# Patient Record
Sex: FEMALE | Race: WHITE | NOT HISPANIC OR LATINO | URBAN - METROPOLITAN AREA
[De-identification: names, ages, dates, MRNs, and addresses within clinical notes are randomized per-mention and may not be internally consistent; named-entity substitution may affect disease eponyms.]

---

## 2024-08-23 ENCOUNTER — HOSPITAL ENCOUNTER (OUTPATIENT)
Dept: RADIOLOGY | Facility: CLINIC | Age: 69
Discharge: HOME | End: 2024-08-23
Payer: MEDICARE

## 2024-08-23 ENCOUNTER — OFFICE VISIT (OUTPATIENT)
Dept: ORTHOPEDIC SURGERY | Facility: CLINIC | Age: 69
End: 2024-08-23
Payer: MEDICARE

## 2024-08-23 VITALS — HEIGHT: 64 IN | WEIGHT: 148 LBS | BODY MASS INDEX: 25.27 KG/M2

## 2024-08-23 DIAGNOSIS — M79.672 LEFT FOOT PAIN: ICD-10-CM

## 2024-08-23 PROCEDURE — 3008F BODY MASS INDEX DOCD: CPT | Performed by: INTERNAL MEDICINE

## 2024-08-23 PROCEDURE — 99213 OFFICE O/P EST LOW 20 MIN: CPT | Performed by: INTERNAL MEDICINE

## 2024-08-23 PROCEDURE — 73630 X-RAY EXAM OF FOOT: CPT | Mod: LT

## 2024-08-23 PROCEDURE — 1036F TOBACCO NON-USER: CPT | Performed by: INTERNAL MEDICINE

## 2024-08-23 ASSESSMENT — PATIENT HEALTH QUESTIONNAIRE - PHQ9
1. LITTLE INTEREST OR PLEASURE IN DOING THINGS: NOT AT ALL
2. FEELING DOWN, DEPRESSED OR HOPELESS: NOT AT ALL
SUM OF ALL RESPONSES TO PHQ9 QUESTIONS 1 AND 2: 0

## 2024-08-23 NOTE — PROGRESS NOTES
Acute Injury New Patient Visit    CC:   Chief Complaint   Patient presents with    Left Foot - Pain     Patient advised that she twisted her foot yesterday while walking in the kitchen she slipped on some water       HPI: Brunilda is a 69 y.o. female presents today for evaluation for acute left foot injury sustained yesterday after she twisted her left foot while walking in her kitchen and fell. She states that she is doing better currently. She is here for initial evaluation and x-rays.  Thank        Review of Systems   GENERAL: Negative for malaise, significant weight loss, fever  MUSCULOSKELETAL: See HPI  NEURO:  Negative for numbness / tingling     Past Medical History  No past medical history on file.    Medication review  Medication Documentation Review Audit    **Prior to Admission medications have not yet been reviewed**         Allergies  Allergies   Allergen Reactions    Codeine Nausea/vomiting    Oxycodone-Acetaminophen Nausea/vomiting     extreme nausea       Social History  Social History     Socioeconomic History    Marital status:      Spouse name: Not on file    Number of children: Not on file    Years of education: Not on file    Highest education level: Not on file   Occupational History    Not on file   Tobacco Use    Smoking status: Never    Smokeless tobacco: Never   Substance and Sexual Activity    Alcohol use: Not on file    Drug use: Not on file    Sexual activity: Not on file   Other Topics Concern    Not on file   Social History Narrative    Not on file     Social Determinants of Health     Financial Resource Strain: Low Risk  (12/10/2023)    Received from [facility]    Overall Financial Resource Strain (CARDIA)     Difficulty of Paying Living Expenses: Not hard at all   Food Insecurity: No Food Insecurity (12/10/2023)    Received from [facility]    Hunger Vital Sign     Worried About Running Out of Food in the Last Year: Never true     Ran Out of Food in the Last Year:  Never true   Transportation Needs: No Transportation Needs (12/10/2023)    Received from [facility]    PRAPARE - Transportation     Lack of Transportation (Medical): No     Lack of Transportation (Non-Medical): No   Physical Activity: Insufficiently Active (12/10/2023)    Received from [facility]    Exercise Vital Sign     Days of Exercise per Week: 3 days     Minutes of Exercise per Session: 30 min   Stress: No Stress Concern Present (12/10/2023)    Received from [facility]    [employer] - Occupational Stress Questionnaire     Feeling of Stress : Only a little   Social Connections: Socially Integrated (12/10/2023)    Received from [facility]    Social Connection and Isolation Panel [NHANES]     Frequency of Communication with Friends and Family: More than three times a week     Frequency of Social Gatherings with Friends and Family: Three times a week     Attends Gn[employer] Services: 1 to 4 times per year     Active Member of Clubs or Organizations: Yes     Attends Club or Organization Meetings: 1 to 4 times per year     Marital Status:    Intimate Partner Violence: Not on file   Housing Stability: Not on file       Surgical History  No past surgical history on file.    Physical Exam:  GENERAL:  Patient is awake, alert, and oriented to person place and time.  Patient appears well nourished and well kept.  Affect Calm, Not Acutely Distressed.  HEENT:  Normocephalic, Atraumatic, EOMI  CARDIOVASCULAR:  Hemodynamically stable.  RESPIRATORY:  Normal respirations with unlabored breathing.  Extremity: Left foot examination shows skin is intact.  There is no erythema or warmth.  No obvious deformity.  There is no clinical signs of infection.  There is no pain over the base of the fifth metatarsal bone.  There is no pain in the midfoot.  No pain over the metatarsal bones.  No pain of the cuboid bone.  There is no pain in the calcaneus.  There is no pain over the  plantar aponeurosis.  There is no pain over the proximal phalanx of the left great toe.  No pain over distal phalanx of the left great toe.  Neurovascularly intact.      Diagnostics: X-rays reviewed      Procedure: None    Assessment: Left foot contusion    Plan: Brunilda presents today for initial evaluation for acute left foot injury sustained yesterday after a fall. X-rays were reviewed and showed no obvious fractures.  Recommend icing and anti-inflammatories with activity modification, reassurance was given to the patient and she will follow-up as needed.  If her pain does worsen, we may consider further imaging.    Orders Placed This Encounter    XR foot left 3+ views      At the conclusion of the visit there were no further questions by the patient/family regarding their plan of care.  Patient was instructed to call or return with any issues, questions, or concerns regarding their injury and/or treatment plan described above.     08/23/24 at 8:28 AM - Beau Li MD  Scribe Attestation  By signing my name below, I, John Crowleyafia, [employer]   attest that this documentation has been prepared under the direction and in the presence of Beau Li MD.    Office: [phone]    This note was prepared using voice recognition software.  The details of this note are correct and have been reviewed, and corrected to the best of my ability.  Some grammatical errors may persist related to the Dragon software.

## 2025-07-03 ENCOUNTER — APPOINTMENT (OUTPATIENT)
Dept: ORTHOPEDIC SURGERY | Facility: CLINIC | Age: 70
End: 2025-07-03
Payer: COMMERCIAL

## 2025-07-08 ENCOUNTER — HOSPITAL ENCOUNTER (OUTPATIENT)
Dept: RADIOLOGY | Facility: CLINIC | Age: 70
Discharge: HOME | End: 2025-07-08
Payer: COMMERCIAL

## 2025-07-08 ENCOUNTER — OFFICE VISIT (OUTPATIENT)
Dept: ORTHOPEDIC SURGERY | Facility: CLINIC | Age: 70
End: 2025-07-08
Payer: COMMERCIAL

## 2025-07-08 DIAGNOSIS — M79.644 BILATERAL THUMB PAIN: ICD-10-CM

## 2025-07-08 DIAGNOSIS — M79.645 BILATERAL THUMB PAIN: ICD-10-CM

## 2025-07-08 PROCEDURE — 99214 OFFICE O/P EST MOD 30 MIN: CPT | Performed by: ORTHOPAEDIC SURGERY

## 2025-07-08 PROCEDURE — 73140 X-RAY EXAM OF FINGER(S): CPT | Mod: RIGHT SIDE | Performed by: ORTHOPAEDIC SURGERY

## 2025-07-08 PROCEDURE — 73140 X-RAY EXAM OF FINGER(S): CPT | Mod: LEFT SIDE | Performed by: ORTHOPAEDIC SURGERY

## 2025-07-08 PROCEDURE — 20600 DRAIN/INJ JOINT/BURSA W/O US: CPT | Mod: LT | Performed by: ORTHOPAEDIC SURGERY

## 2025-07-08 PROCEDURE — 73140 X-RAY EXAM OF FINGER(S): CPT | Mod: LT

## 2025-07-08 PROCEDURE — 1159F MED LIST DOCD IN RCRD: CPT | Performed by: ORTHOPAEDIC SURGERY

## 2025-07-08 PROCEDURE — 20600 DRAIN/INJ JOINT/BURSA W/O US: CPT | Mod: RT | Performed by: ORTHOPAEDIC SURGERY

## 2025-07-08 PROCEDURE — 2500000004 HC RX 250 GENERAL PHARMACY W/ HCPCS (ALT 636 FOR OP/ED): Performed by: ORTHOPAEDIC SURGERY

## 2025-07-08 PROCEDURE — 73140 X-RAY EXAM OF FINGER(S): CPT | Mod: RT

## 2025-07-08 PROCEDURE — 1036F TOBACCO NON-USER: CPT | Performed by: ORTHOPAEDIC SURGERY

## 2025-07-08 PROCEDURE — 99212 OFFICE O/P EST SF 10 MIN: CPT | Mod: 25 | Performed by: ORTHOPAEDIC SURGERY

## 2025-07-08 RX ORDER — LIDOCAINE HYDROCHLORIDE 10 MG/ML
0.5 INJECTION, SOLUTION INFILTRATION; PERINEURAL
Status: COMPLETED | OUTPATIENT
Start: 2025-07-08 | End: 2025-07-08

## 2025-07-08 RX ADMIN — LIDOCAINE HYDROCHLORIDE 0.5 ML: 10 INJECTION, SOLUTION INFILTRATION; PERINEURAL at 09:01

## 2025-07-08 RX ADMIN — TRIAMCINOLONE ACETONIDE 5 MG: 10 INJECTION, SUSPENSION INTRA-ARTICULAR; INTRALESIONAL at 09:01

## 2025-07-08 NOTE — PROGRESS NOTES
7/8/2025    Chief Complaint   Patient presents with    Right Thumb - Pain, Arthritis     Cmc arthroplasty 6/26/20  Xray today    Left Thumb - Pain, Arthritis     Cmc arthritis   Xray today         History of Present Illness:  Patient Brunilda Jerry , 70 y.o. female, presents today, 7/8/2025, for evaluation of bilateral thumb pain.  Patient has history of right thumb CMC arthroplasty with Dr. Tabor done 5 years ago and left thumb CMC arthroplasty done with Dr. Jarad Ram about 22 years ago.  She underwent injection for painful recurrence of pain and discomfort at the CMC zone on the left about 5 years ago and got good symptomatic relief up until a month or so prior to this visit.  She also sustained a fall on outstretched bilateral hands about 6 weeks ago that resulted in pain and swelling and some ecchymosis to the base of the right thumb.  This is pain-free and doing great until then.  She states that symptoms on the left are still worse than on the right and she describes difficulties with pinching .  She is requesting repeat injections today.       Review of Systems:   GENERAL: Negative  GI: Negative  MUSCULOSKELETAL: See HPI  SKIN: Negative  NEURO:  Negative     Physical Exam:  GENERAL:  Alert and oriented to person, place, and time.  No acute distress and breathing comfortably; pleasant and cooperative with the examination.  HEENT:  Head is normocephalic and atraumatic.  NECK:  Supple, no visible swelling.  CARDIOVASCULAR:  No palpable tachycardia.  LUNGS:  No audible wheezing or labored breathing.  ABDOMEN:  Nondistended.  Extremities: Evaluation of bilateral upper extremities finds the patient to have a palpable radial artery at the wrist with brisk capillary refill to all digits. The patient has intact sensorium to axillary, radial, median and ulnar nerves. There are no open wounds. There are no signs of infection. There is no evidence of lymphedema or lymphatic streaking. The patient has supple  compartments of the bilateral arms, forearms and hands.  Surgical incisions are well-healed.  She demonstrates mild tenderness through previous zone of dissection for CMC arthroplasty.  Mild swelling is noted.     Imaging/Test Results:  Radiographs of the bilateral thumb show evidence of metacarpal subsidence to the first metacarpals bilaterally.  There is concerns for failure of CMC arthroplasty construct bilaterally.  No acute fracture or dislocation noted.     Assessment:  Bilateral thumb CMC arthroplasties with suspected failure with recurrence of pain and discomfort after recent mechanical fall.     Plan:  Treatment options were reviewed at length including operative and nonoperative strategies.  We did discuss the option of revision CMC arthroplasty.  Patient declines today in favor of nonoperative management.  Recommend for trial of injection to the bilateral thumb CMC's.  This was performed in office today by Dr. Tabor and tolerated well.  Follow-up in 6 weeks for repeat clinical exam, no x-rays necessary upon return.  All questions answered today's visit.    Hand / UE Inj/Asp: R thumb CMC for osteoarthritis on 7/8/2025 9:01 AM  Indications: pain  Details: 25 G needle, dorsal approach  Medications: 5 mg triamcinolone acetonide 10 mg/mL; 0.5 mL lidocaine 10 mg/mL (1 %)  Outcome: tolerated well, no immediate complications    Right Thumb Carpometacarpal Joint Injection: It was explained to the patient that the risks of a steroid injection include but are not limited to infection, local skin irritation, skin atrophy, calcification, continued pain and discomfort, elevated blood sugar, burning, failure to relieve pain, and possible late infection. The patient verbalized good insight and verbalized consent for the injection. It was further explained that the postinjection discomfort can be alleviated with additional medications, ice, elevation, and rest over the first 24 hours, and that these modalities are  recommended.    Using aseptic technique, a solution containing 0.5 cc of 5 mg of Kenalog and 0.5 mL of 1% lidocaine without epinephrine was injected intraarticularly to the right thumb carpometacarpal joint. Digital palpation was used to localize the CMC articulation about the dorsal radial aspect of the joint. Gentle traction was then imparted to the thumb distally and a 25-gauge needle was advanced through the skin, subcutaneous tissue and capsule. The injection was then administered and the patient tolerated the injection well. A band-aid was then placed. It should be noted that ethyl chloride spray was used to make the injection delivery more comfortable for the patient.  Procedure, treatment alternatives, risks and benefits explained, specific risks discussed. Consent was given by the patient. Immediately prior to procedure a time out was called to verify the correct patient, procedure, equipment, support staff and site/side marked as required. Patient was prepped and draped in the usual sterile fashion.       Hand / UE Inj/Asp: L thumb CMC for osteoarthritis on 7/8/2025 9:01 AM  Indications: pain  Details: 25 G needle, dorsal approach  Medications: 5 mg triamcinolone acetonide 10 mg/mL; 0.5 mL lidocaine 10 mg/mL (1 %)  Outcome: tolerated well, no immediate complications    Left Thumb Carpometacarpal Joint Injection: It was explained to the patient that the risks of a steroid injection include but are not limited to infection, local skin irritation, skin atrophy, calcification, continued pain and discomfort, elevated blood sugar, burning, failure to relieve pain, and possible late infection. The patient verbalized good insight and verbalized consent for the injection. It was further explained that the postinjection discomfort can be alleviated with additional medications, ice, elevation, and rest over the first 24 hours, and that these modalities are recommended.    Using aseptic technique, a solution  containing 0.5 cc of 5 mg of Kenalog and 0.5 mL of 1% lidocaine without epinephrine was injected intraarticularly to the left thumb carpometacarpal joint. Digital palpation was used to localize the CMC articulation about the dorsal radial aspect of the joint. Gentle traction was then imparted to the thumb distally and a 25-gauge needle was advanced through the skin, subcutaneous tissue and capsule. The injection was then administered and the patient tolerated the injection well. A band-aid was then placed. It should be noted that ethyl chloride spray was used to make the injection delivery more comfortable for the patient.  Procedure, treatment alternatives, risks and benefits explained, specific risks discussed. Consent was given by the patient. Immediately prior to procedure a time out was called to verify the correct patient, procedure, equipment, support staff and site/side marked as required. Patient was prepped and draped in the usual sterile fashion.         In a face to face encounter, I performed a history and physical examination, discussed pertinent diagnostic studies if indicated, and discussed diagnosis and management strategies with both the patient and the mid-level provider. I reviewed the mid-level's note and agree with the documented findings and plan of care.  Patient presents today for evaluation of bilateral basilar thumb pain.  The patient is status post right thumb CMC arthroplasty done by me in 2020.  Left thumb was done by Dr. Ram approximately 22 years ago.  They are both painful.  The right thumb pain started after a recent injury.  Prior to that there was no discomfort.  Today there is evidence for subsidence to the bilateral thumb CMC arthroplasties.  She is adamant however that she was having no pain on the right prior to this injury.  We talked about subsidence and the significance.  Recommendations were made for trial of steroid injection to bilateral thumb CMC joints and a 6-week  follow-up.  Patient is agreeable.  The left side is more chronically painful and could need revision.  I am suspicious that the right side may need revision over time as well.  No x-rays upon return.

## 2025-08-19 ENCOUNTER — APPOINTMENT (OUTPATIENT)
Dept: ORTHOPEDIC SURGERY | Facility: CLINIC | Age: 70
End: 2025-08-19
Payer: MEDICARE